# Patient Record
Sex: FEMALE | URBAN - METROPOLITAN AREA
[De-identification: names, ages, dates, MRNs, and addresses within clinical notes are randomized per-mention and may not be internally consistent; named-entity substitution may affect disease eponyms.]

---
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
## 2023-02-28 NOTE — PROCEDURE: MEDICATION COUNSELING
Detail Level: Detailed Size Of Lesion: 2.0 x 2.0 cm Metronidazole Counseling:  I discussed with the patient the risks of metronidazole including but not limited to seizures, nausea/vomiting, a metallic taste in the mouth, nausea/vomiting and severe allergy.

## 2023-10-18 NOTE — PROCEDURE: MEDICATION COUNSELING
HD flu shot administered. Niacinamide Pregnancy And Lactation Text: These medications are considered safe during pregnancy.

## 2024-08-05 NOTE — PROCEDURE: MEDICATION COUNSELING
PCP: Sigrid Meneses MD     Last appt:  7/2/2024    No future appointments.       Requested Prescriptions     Pending Prescriptions Disp Refills    pregabalin (LYRICA) 75 MG capsule [Pharmacy Med Name: PREGABALIN 75 MG CAPSULE] 60 capsule 0     Sig: Take 1 capsule by mouth 2 times daily for 60 days. Max Daily Amount: 150 mg       Imiquimod Pregnancy And Lactation Text: This medication is Pregnancy Category C. It is unknown if this medication is excreted in breast milk.